# Patient Record
Sex: FEMALE | URBAN - METROPOLITAN AREA
[De-identification: names, ages, dates, MRNs, and addresses within clinical notes are randomized per-mention and may not be internally consistent; named-entity substitution may affect disease eponyms.]

---

## 2017-04-21 ENCOUNTER — THERAPY VISIT (OUTPATIENT)
Dept: PHYSICAL THERAPY | Facility: CLINIC | Age: 37
End: 2017-04-21
Payer: COMMERCIAL

## 2017-04-21 DIAGNOSIS — Z33.1 PREGNANT STATE, INCIDENTAL: ICD-10-CM

## 2017-04-21 DIAGNOSIS — M54.42 ACUTE BILATERAL LOW BACK PAIN WITH LEFT-SIDED SCIATICA: Primary | ICD-10-CM

## 2017-04-21 PROCEDURE — 97530 THERAPEUTIC ACTIVITIES: CPT | Mod: GP | Performed by: PHYSICAL THERAPIST

## 2017-04-21 PROCEDURE — 97140 MANUAL THERAPY 1/> REGIONS: CPT | Mod: GP | Performed by: PHYSICAL THERAPIST

## 2017-04-21 PROCEDURE — 97161 PT EVAL LOW COMPLEX 20 MIN: CPT | Mod: GP | Performed by: PHYSICAL THERAPIST

## 2017-04-21 ASSESSMENT — ACTIVITIES OF DAILY LIVING (ADL)
GOING_DOWN_1_FLIGHT_OF_STAIRS: SLIGHT DIFFICULTY
WALKING_UP_STEEP_HILLS: MODERATE DIFFICULTY
TWISTING/PIVOTING_ON_INVOLVED_LEG: MODERATE DIFFICULTY
LIGHT_TO_MODERATE_WORK: SLIGHT DIFFICULTY
PUTTING_ON_SOCKS_AND_SHOES: NO DIFFICULTY AT ALL
GOING_UP_1_FLIGHT_OF_STAIRS: MODERATE DIFFICULTY
WALKING_15_MINUTES_OR_GREATER: MODERATE DIFFICULTY
WALKING_DOWN_STEEP_HILLS: MODERATE DIFFICULTY
SITTING_FOR_15_MINUTES: SLIGHT DIFFICULTY
HOS_ADL_COUNT: 14
WALKING_INITIALLY: MODERATE DIFFICULTY
HOW_WOULD_YOU_RATE_YOUR_CURRENT_LEVEL_OF_FUNCTION_DURING_YOUR_USUAL_ACTIVITIES_OF_DAILY_LIVING_FROM_0_TO_100_WITH_100_BEING_YOUR_LEVEL_OF_FUNCTION_PRIOR_TO_YOUR_HIP_PROBLEM_AND_0_BEING_THE_INABILITY_TO_PERFORM_ANY_OF_YOUR_USUAL_DAILY_ACTIVITIES?: 75
HOS_ADL_SCORE(%): 64.29
HOS_ADL_HIGHEST_POTENTIAL_SCORE: 56
HOS_ADL_ITEM_SCORE_TOTAL: 36
STANDING_FOR_15_MINUTES: SLIGHT DIFFICULTY
WALKING_APPROXIMATELY_10_MINUTES: MODERATE DIFFICULTY
HEAVY_WORK: MODERATE DIFFICULTY
GETTING_INTO_AND_OUT_OF_AN_AVERAGE_CAR: NO DIFFICULTY AT ALL
GETTING_INTO_AND_OUT_OF_A_BATHTUB: NO DIFFICULTY AT ALL
ROLLING_OVER_IN_BED: SLIGHT DIFFICULTY

## 2017-04-21 NOTE — PROGRESS NOTES
Subjective:  [facility] Initial Evaluation -- Lumbar    Date: April 21, 2017  Alexandrea Kendall is a 37 year old female with a LBP condition. She is 8 mths pregnant (due 4/26) with 12th child.  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   Symptoms (improving/unchanging/worsening):  unchanging.   Symptoms commenced as a result of: unknown but contributes to pregnancy   Condition occurred in the following environment:   home     Symptoms at onset (back/thigh/leg): thigh  Constant symptoms (back/thigh/leg): thigh  Intermittent symptoms (back/thigh/leg): thigh, leg    Symptoms are made worse with the following: Always Walking, always transitioning in bed   Symptoms are made better with the following: Sometimes Sitting    Disturbed sleep (yes/no):  no Sleeping postures (prone/sup/side R/L): side    Previous episodes (0/1-5/6-10/11+): 0 Year of first episode: current    Previous history: no prior hx  Previous treatments: none      Specific Questions:  Cough/Sneeze/Strain (pos/neg): neg  Bowel/Bladder (normal/abnormal): normal  Gait (normal/abnormal): antalgic due to pain  Medications (nil/NSAIDS/analg/steroids/anticoag/other):  None  Medical allergies:  NKDA  General health (excellent/good/fair/poor):  good  Pertinent medical history:  Currently pregnant  Imaging (NA/Xray/MRI):  na  Recent or major surgery (yes/no):  no  Night pain (yes/no): no  Accidents (yes/no): no  Unexplained weight loss (yes/no): no  Barriers at home: no  Other red flags: no    EXAMINATION    Posture:   Sitting (good/fair/poor): fair  Standing (good/fair/poor):fair- preg related  changes  Lordosis (red/acc/normal):normal for pregnancy related changes  Correction of posture (better/worse/no effect): NE    Lateral Shift (right/left/nil): nil  Relevant (yes/no):  na  Other Observations: na    Neurological:    Motor deficit:  Left hip flexion 4/5  Reflexes:  2+  Sensory deficit:  no  Dural signs:  +slump (concordant)    Baseline: walking 10, antalgic      Movement Loss:   Benson Mod Min Nil Pain   Flexion   X due to late preg  +   Extension   x  ++   Side Gliding R    x -   Side Gliding L    x -     Test Movements:   During: produces, abolishes, increases, decreases, no effect, centralizing, peripheralizing   After: better, worse, no better, no worse, no effect, centralized, peripheralized    Pretest symptoms standin/10 left LB   Symptoms During Symptoms After ROM increased ROM decreased No Effect   FIS         Rep FIS        EIS Increases No Worse         Rep EIS Increases No Worse         Pretest symptoms lying: nt    Symptoms During Symptoms After ROM increased ROM decreased No Effect   SHERITA        Rep SHERITA        Prone unilateral  Lumbar ext No Effect No Effect         PULE Decreases Better slump, walking pain      I    Static Tests: NT  Other Tests: ASLR positive, P4 positive; dec pain post belt stab w walking    Provisional Classification:  SI Derangement - Asymmetrical, unilateral, symptoms above knee    Principle of Management:  Education:  Use of belt   Equipment provided:  belt  Mechanical therapy (Y/N):  y   Extension principle:  PULE  Lateral Principle:  na  Flexion principle:  na  Other:  na    ASSESSMENT/PLAN:    Patient is a 37 year old female with lumbar complaints.    Patient has the following significant findings with corresponding treatment plan.                Diagnosis 1:  LBP  Pain -  manual therapy, splint/taping/bracing/orthotics, self management, education, directional preference exercise and home program  Impaired gait - gait training  Impaired muscle performance -  home program  Decreased function - therapeutic activities  Diagnosis 2:  Pregnancy   Pain -  manual therapy, splint/taping/bracing/orthotics, self management, education, directional preference exercise and home program    Therapy Evaluation Codes:   1) History comprised of:   Personal factors that impact the plan of care:      None.    Comorbidity factors that impact the plan of care are:      Current pregnancy.     Medications impacting care: None.  2) Examination of Body Systems comprised of:   Body structures and functions that impact the plan of care:      Lumbar spine and Sacral illiac joint.   Activity limitations that impact the plan of care are:      Walking.  3) Clinical presentation characteristics are:   Evolving/Changing.  4) Decision-Making    Moderate complexity using standardized patient assessment instrument and/or measureable assessment of functional outcome.  Cumulative Therapy Evaluation is: Moderate complexity.    Previous and current functional limitations:  (See Goal Flow Sheet for this information)    Short term and Long term goals: (See Goal Flow Sheet for this information)     Communication ability:  Patient appears to be able to clearly communicate and understand verbal and written communication and follow directions correctly.  Treatment Explanation - The following has been discussed with the patient:   RX ordered/plan of care  Anticipated outcomes  Possible risks and side effects  This patient would benefit from PT intervention to resume normal activities.   Rehab potential is good.    Frequency:  1 X week, once daily  Duration:  for 2 weeks  Discharge Plan:  Achieve all LTG.  Independent in home treatment program.  Reach maximal therapeutic benefit.    Please refer to the daily flowsheet for treatment today, total treatment time and time spent performing 1:1 timed codes.         HPI                    Objective:    System    Physical Exam    General     ROS    Assessment/Plan:

## 2017-04-21 NOTE — LETTER
[facility]  [address]  [place] [address]  [phone]    2017    Re: Alexandrea Kendall   :   1980  MRN:  [id number]   REFERRING PHYSICIAN: Dr. Macias     [facility]  Date of Initial Evaluation:  2017  Visits: 1  Rxs Used: 1  Reason for Referral:     Acute bilateral low back pain with left-sided sciatica  Pregnant state, incidental    EVALUATION SUMMARY    Subjective:  [facility] Initial Evaluation -- Lumbar  Date: 2017  Alexandrea Kendall is a 37 year old female with a LBP condition. She is 8 mths pregnant (due ) with 12th child.  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   Symptoms (improving/unchanging/worsening):  unchanging.   Symptoms commenced as a result of: unknown but contributes to pregnancy   Condition occurred in the following environment:   home     Symptoms at onset (back/thigh/leg): thigh  Constant symptoms (back/thigh/leg): thigh  Intermittent symptoms (back/thigh/leg): thigh, leg    Symptoms are made worse with the following: Always Walking, always transitioning in bed   Symptoms are made better with the following: Sometimes Sitting    Re: Alexandrea Kendall   :   1980    Disturbed sleep (yes/no):  no Sleeping postures (prone/sup/side R/L): side    Previous episodes (0/1-5/6-10/11+): 0 Year of first episode: current    Previous history: no prior hx  Previous treatments: none    Specific Questions:  Cough/Sneeze/Strain (pos/neg): neg  Bowel/Bladder (normal/abnormal):  normal  Gait (normal/abnormal): antalgic due to pain  Medications (nil/NSAIDS/analg/steroids/anticoag/other):  None  Medical allergies:  NKDA  General health (excellent/good/fair/poor):  good  Pertinent medical history:  Currently pregnant  Imaging (NA/Xray/MRI):  na  Recent or major surgery (yes/no):  no  Night pain (yes/no): no  Accidents (yes/no): no  Unexplained weight loss (yes/no): no  Barriers at home: no  Other red flags: no  EXAMINATION  Posture:   Sitting (good/fair/poor): fair  Standing (good/fair/poor):fair- preg related changes  Lordosis (red/acc/normal):normal for pregnancy related changes  Correction of posture (better/worse/no effect): NE  Lateral Shift (right/left/nil): nil  Relevant (yes/no):  na  Other Observations: na  Neurological:  Motor deficit:  Left hip flexion 4/5  Reflexes:  2+  Sensory deficit:  no  Dural signs:  +slump (concordant)  Baseline: walking 6/10, antalgic  Movement Loss:   Benson Mod Min Nil Pain   Flexion   X due to late preg  +   Extension   x  ++   Side Gliding R    x -   Side Gliding L    x -   Test Movements:   During: produces, abolishes, increases, decreases, no effect, centralizing, peripheralizing   Re: Alexandrea Kendall   :   1980    After: better, worse, no better, no worse, no effect, centralized, peripheralized  Pretest symptoms standin/10 left LB   Symptoms During Symptoms After ROM increased ROM decreased No Effect   FIS         Rep FIS        EIS Increases No Worse         Rep EIS Increases No Worse         Pretest symptoms lying: nt    Symptoms During Symptoms After ROM increased ROM decreased No Effect   SHERITA        Rep SHERITA        Prone unilateral  Lumbar ext No Effect No Effect         PULE Decreases Better slump, walking pain      I  Static Tests: NT  Other Tests: ASLR positive, P4 positive; dec pain post belt stab w walking  Provisional Classification:  SI Derangement - Asymmetrical, unilateral, symptoms above knee  Principle of  Management:  Education:  Use of belt   Equipment provided:  belt  Mechanical therapy (Y/N):  y   Extension principle:  PULE  Lateral Principle:  na  Flexion principle:  na  Other:  na  ASSESSMENT/PLAN:  Patient is a 37 year old female with lumbar complaints.    Patient has the following significant findings with corresponding treatment plan.                Diagnosis 1:  LBP  Pain -  manual therapy, splint/taping/bracing/orthotics, self management, education, directional preference exercise and home program  Impaired gait - gait training  Impaired muscle performance - home program  Decreased function - therapeutic activities  Diagnosis 2:  Pregnancy   Pain -  manual therapy, splint/taping/bracing/orthotics, self management, education, directional preference exercise and home program  Therapy Evaluation Codes:   1) History comprised of:   Personal factors that impact the plan of care:      None.    Comorbidity factors that impact the plan of care are:      Current pregnancy.     Medications impacting care: None.  Re: Alexandrea Kendall   :   1980  2) Examination of Body Systems comprised of:   Body structures and functions that impact the plan of care:      Lumbar spine and Sacral illiac joint.   Activity limitations that impact the plan of care are:      Walking.  3) Clinical presentation characteristics are:   Evolving/Changing.  4) Decision-Making    Moderate complexity using standardized patient assessment instrument and/or measureable assessment of functional outcome.  Cumulative Therapy Evaluation is: Moderate complexity.  Previous and current functional limitations:  (See Goal Flow Sheet for this information)    Short term and Long term goals: (See Goal Flow Sheet for this information)   Communication ability:  Patient appears to be able to clearly communicate and understand verbal and written communication and follow directions correctly.  Treatment Explanation - The following has been discussed with  the patient:   RX ordered/plan of care  Anticipated outcomes  Possible risks and side effects  This patient would benefit from PT intervention to resume normal activities.   Rehab potential is good.  Frequency:  1 X week, once daily  Duration:  for 2 weeks  Discharge Plan:  Achieve all LTG.  Independent in home treatment program.  Reach maximal therapeutic benefit.    Thank you for your referral.    INQUIRIES  Therapist: Jennifer Nuñez, PT, OCS, Cert. MDT   [facility]  [address] [place] [id number]  Phone: [phone]  Fax: [phone]

## 2024-09-21 ENCOUNTER — HEALTH MAINTENANCE LETTER (OUTPATIENT)
Age: 44
End: 2024-09-21